# Patient Record
Sex: FEMALE | Race: BLACK OR AFRICAN AMERICAN | NOT HISPANIC OR LATINO | Employment: FULL TIME | URBAN - METROPOLITAN AREA
[De-identification: names, ages, dates, MRNs, and addresses within clinical notes are randomized per-mention and may not be internally consistent; named-entity substitution may affect disease eponyms.]

---

## 2022-09-18 ENCOUNTER — HOSPITAL ENCOUNTER (EMERGENCY)
Facility: HOSPITAL | Age: 52
Discharge: HOME | End: 2022-09-18
Attending: STUDENT IN AN ORGANIZED HEALTH CARE EDUCATION/TRAINING PROGRAM | Admitting: EMERGENCY MEDICINE

## 2022-09-18 ENCOUNTER — APPOINTMENT (OUTPATIENT)
Dept: GENERAL RADIOLOGY | Facility: HOSPITAL | Age: 52
End: 2022-09-18

## 2022-09-18 VITALS
WEIGHT: 238.76 LBS | OXYGEN SATURATION: 92 % | DIASTOLIC BLOOD PRESSURE: 78 MMHG | RESPIRATION RATE: 18 BRPM | TEMPERATURE: 98.1 F | HEIGHT: 66 IN | BODY MASS INDEX: 38.37 KG/M2 | SYSTOLIC BLOOD PRESSURE: 135 MMHG | HEART RATE: 99 BPM

## 2022-09-18 DIAGNOSIS — R21 SKIN RASH: ICD-10-CM

## 2022-09-18 DIAGNOSIS — M54.50 ACUTE RIGHT-SIDED LOW BACK PAIN WITHOUT SCIATICA: Primary | ICD-10-CM

## 2022-09-18 PROCEDURE — 25010000002 KETOROLAC TROMETHAMINE PER 15 MG: Performed by: NURSE PRACTITIONER

## 2022-09-18 PROCEDURE — 99281 EMR DPT VST MAYX REQ PHY/QHP: CPT

## 2022-09-18 PROCEDURE — 99282 EMERGENCY DEPT VISIT SF MDM: CPT

## 2022-09-18 PROCEDURE — 96372 THER/PROPH/DIAG INJ SC/IM: CPT

## 2022-09-18 PROCEDURE — 72100 X-RAY EXAM L-S SPINE 2/3 VWS: CPT

## 2022-09-18 PROCEDURE — 25010000002 ORPHENADRINE CITRATE PER 60 MG: Performed by: NURSE PRACTITIONER

## 2022-09-18 RX ORDER — ORPHENADRINE CITRATE 30 MG/ML
60 INJECTION INTRAMUSCULAR; INTRAVENOUS ONCE
Status: COMPLETED | OUTPATIENT
Start: 2022-09-18 | End: 2022-09-18

## 2022-09-18 RX ORDER — CYCLOBENZAPRINE HCL 10 MG
10 TABLET ORAL 3 TIMES DAILY PRN
Qty: 15 TABLET | Refills: 0 | Status: SHIPPED | OUTPATIENT
Start: 2022-09-18

## 2022-09-18 RX ORDER — CLOTRIMAZOLE AND BETAMETHASONE DIPROPIONATE 10; .64 MG/G; MG/G
1 CREAM TOPICAL 2 TIMES DAILY
Qty: 20 G | Refills: 0 | Status: SHIPPED | OUTPATIENT
Start: 2022-09-18 | End: 2022-09-28

## 2022-09-18 RX ORDER — KETOROLAC TROMETHAMINE 30 MG/ML
60 INJECTION, SOLUTION INTRAMUSCULAR; INTRAVENOUS ONCE
Status: COMPLETED | OUTPATIENT
Start: 2022-09-18 | End: 2022-09-18

## 2022-09-18 RX ORDER — KETOROLAC TROMETHAMINE 10 MG/1
10 TABLET, FILM COATED ORAL EVERY 6 HOURS PRN
Qty: 20 TABLET | Refills: 0 | Status: SHIPPED | OUTPATIENT
Start: 2022-09-18 | End: 2022-11-21

## 2022-09-18 RX ADMIN — ORPHENADRINE CITRATE 60 MG: 60 INJECTION INTRAMUSCULAR; INTRAVENOUS at 14:44

## 2022-09-18 RX ADMIN — KETOROLAC TROMETHAMINE 60 MG: 30 INJECTION, SOLUTION INTRAMUSCULAR at 14:44

## 2023-03-29 ENCOUNTER — TELEPHONE (OUTPATIENT)
Dept: FAMILY MEDICINE CLINIC | Facility: CLINIC | Age: 53
End: 2023-03-29

## 2023-03-29 NOTE — TELEPHONE ENCOUNTER
Patient missed appointment on 03/29/2023 @ 1:00 with Sushila Umana. Called number in chart. No answer. Left message explaining policy concerning missed appointments and same day cancellations. Offered to reschedule appointment

## 2023-03-31 ENCOUNTER — TELEPHONE (OUTPATIENT)
Dept: FAMILY MEDICINE CLINIC | Facility: CLINIC | Age: 53
End: 2023-03-31
Payer: MEDICAID

## 2023-03-31 NOTE — TELEPHONE ENCOUNTER
Patient missed appointment on 03/29/2023 @ 1:00 with Sushila Umana. Called number in chart. No answer. Second request. Sending letter.

## 2024-08-13 ENCOUNTER — HOSPITAL ENCOUNTER (EMERGENCY)
Facility: HOSPITAL | Age: 54
Discharge: HOME | End: 2024-08-13
Attending: EMERGENCY MEDICINE
Payer: MEDICAID

## 2024-08-13 VITALS
OXYGEN SATURATION: 96 % | SYSTOLIC BLOOD PRESSURE: 172 MMHG | HEIGHT: 66 IN | RESPIRATION RATE: 17 BRPM | DIASTOLIC BLOOD PRESSURE: 94 MMHG | WEIGHT: 220.24 LBS | HEART RATE: 92 BPM | BODY MASS INDEX: 35.4 KG/M2 | TEMPERATURE: 98.4 F

## 2024-08-13 DIAGNOSIS — U07.1 COVID: Primary | ICD-10-CM

## 2024-08-13 LAB
FLUAV SUBTYP SPEC NAA+PROBE: NOT DETECTED
FLUBV RNA ISLT QL NAA+PROBE: NOT DETECTED
RSV RNA NPH QL NAA+NON-PROBE: NOT DETECTED
S PYO AG THROAT QL: NEGATIVE
SARS-COV-2 RNA RESP QL NAA+PROBE: DETECTED

## 2024-08-13 PROCEDURE — 99283 EMERGENCY DEPT VISIT LOW MDM: CPT

## 2024-08-13 PROCEDURE — 96372 THER/PROPH/DIAG INJ SC/IM: CPT

## 2024-08-13 PROCEDURE — 25010000002 KETOROLAC TROMETHAMINE PER 15 MG: Performed by: EMERGENCY MEDICINE

## 2024-08-13 PROCEDURE — 87880 STREP A ASSAY W/OPTIC: CPT | Performed by: EMERGENCY MEDICINE

## 2024-08-13 PROCEDURE — 87637 SARSCOV2&INF A&B&RSV AMP PRB: CPT | Performed by: EMERGENCY MEDICINE

## 2024-08-13 PROCEDURE — 87081 CULTURE SCREEN ONLY: CPT | Performed by: EMERGENCY MEDICINE

## 2024-08-13 RX ORDER — BENZONATATE 200 MG/1
200 CAPSULE ORAL 3 TIMES DAILY PRN
Qty: 20 CAPSULE | Refills: 0 | Status: SHIPPED | OUTPATIENT
Start: 2024-08-13

## 2024-08-13 RX ORDER — ACETAMINOPHEN 325 MG/1
650 TABLET ORAL ONCE
Status: COMPLETED | OUTPATIENT
Start: 2024-08-13 | End: 2024-08-13

## 2024-08-13 RX ORDER — KETOROLAC TROMETHAMINE 10 MG/1
10 TABLET, FILM COATED ORAL EVERY 6 HOURS PRN
Qty: 15 TABLET | Refills: 0 | Status: SHIPPED | OUTPATIENT
Start: 2024-08-13

## 2024-08-13 RX ORDER — KETOROLAC TROMETHAMINE 30 MG/ML
60 INJECTION, SOLUTION INTRAMUSCULAR; INTRAVENOUS ONCE
Status: COMPLETED | OUTPATIENT
Start: 2024-08-13 | End: 2024-08-13

## 2024-08-13 RX ADMIN — ACETAMINOPHEN 650 MG: 325 TABLET ORAL at 10:49

## 2024-08-13 RX ADMIN — KETOROLAC TROMETHAMINE 60 MG: 30 INJECTION, SOLUTION INTRAMUSCULAR at 10:50

## 2024-08-13 NOTE — ED PROVIDER NOTES
Time: 10:39 AM EDT  Date of encounter:  8/13/2024  Independent Historian/Clinical History and Information was obtained by:   Patient    History is limited by: N/A    Chief Complaint: Cough      History of Present Illness:  Patient is a 54 y.o. year old female who presents to the emergency department for evaluation of cough and shortness of breath accompanied by headache that started 2 days ago.  Patient also reports subjective fever.  Patient denies nausea, vomiting, and diarrhea.  Patient has no leg pain or swelling.  Patient has no dysuria or urinary frequency.    HPI    Patient Care Team  Primary Care Provider: Provider, No Known    Past Medical History:     No Known Allergies  History reviewed. No pertinent past medical history.  Past Surgical History:   Procedure Laterality Date    BREAST AUGMENTATION      HYSTERECTOMY      TONSILLECTOMY      TUBAL ABDOMINAL LIGATION       History reviewed. No pertinent family history.    Home Medications:  Prior to Admission medications    Medication Sig Start Date End Date Taking? Authorizing Provider   cyclobenzaprine (FLEXERIL) 10 MG tablet Take 1 tablet by mouth 3 (Three) Times a Day As Needed for Muscle Spasms for up to 15 doses. 9/18/22   Sharri Faustin APRN   ondansetron ODT (ZOFRAN-ODT) 4 MG disintegrating tablet Place 1 tablet on the tongue Every 8 (Eight) Hours As Needed for Nausea or Vomiting. 1/30/24   Debbie Murphy PA-C        Social History:   Social History     Tobacco Use    Smoking status: Every Day     Types: Cigarettes    Smokeless tobacco: Never   Vaping Use    Vaping status: Former   Substance Use Topics    Alcohol use: Not Currently    Drug use: Never         Review of Systems:  Review of Systems   Constitutional:  Negative for chills and fever.   HENT:  Negative for congestion, rhinorrhea and sore throat.    Eyes:  Negative for pain and visual disturbance.   Respiratory:  Positive for cough. Negative for apnea, chest tightness and shortness of  "breath.    Cardiovascular:  Negative for chest pain and palpitations.   Gastrointestinal:  Negative for abdominal pain, diarrhea, nausea and vomiting.   Genitourinary:  Negative for difficulty urinating and dysuria.   Musculoskeletal:  Negative for joint swelling and myalgias.   Skin:  Negative for color change.   Neurological:  Positive for headaches. Negative for seizures.   Psychiatric/Behavioral: Negative.     All other systems reviewed and are negative.       Physical Exam:  /94 (BP Location: Left arm, Patient Position: Sitting)   Pulse 91   Temp 98.4 °F (36.9 °C) (Oral)   Resp 16   Ht 167.6 cm (66\")   Wt 99.9 kg (220 lb 3.8 oz)   SpO2 96%   BMI 35.55 kg/m²     Physical Exam  Vitals and nursing note reviewed.   Constitutional:       General: She is not in acute distress.     Appearance: Normal appearance. She is not toxic-appearing.   HENT:      Head: Normocephalic and atraumatic.      Jaw: There is normal jaw occlusion.   Eyes:      General: Lids are normal.      Extraocular Movements: Extraocular movements intact.      Conjunctiva/sclera: Conjunctivae normal.      Pupils: Pupils are equal, round, and reactive to light.   Cardiovascular:      Rate and Rhythm: Normal rate and regular rhythm.      Pulses: Normal pulses.      Heart sounds: Normal heart sounds.   Pulmonary:      Effort: Pulmonary effort is normal. No respiratory distress.      Breath sounds: Normal breath sounds. No wheezing or rhonchi.   Abdominal:      General: Abdomen is flat.      Palpations: Abdomen is soft.      Tenderness: There is no abdominal tenderness. There is no guarding or rebound.   Musculoskeletal:         General: Normal range of motion.      Cervical back: Normal range of motion and neck supple.      Right lower leg: No edema.      Left lower leg: No edema.   Skin:     General: Skin is warm and dry.   Neurological:      Mental Status: She is alert and oriented to person, place, and time. Mental status is at baseline. "   Psychiatric:         Mood and Affect: Mood normal.                  Procedures:  Procedures      Medical Decision Making:      Comorbidities that affect care:    None    External Notes reviewed:    Previous Clinic Note: Patient was last seen in clinic for sinus congestion.      The following orders were placed and all results were independently analyzed by me:  Orders Placed This Encounter   Procedures    COVID-19, FLU A/B, RSV PCR 1 HR TAT - Swab, Nasopharynx    Rapid Strep A Screen - Swab, Throat    Beta Strep Culture, Throat - Swab, Throat       Medications Given in the Emergency Department:  Medications   ketorolac (TORADOL) injection 60 mg (60 mg Intramuscular Given 8/13/24 1050)   acetaminophen (TYLENOL) tablet 650 mg (650 mg Oral Given 8/13/24 1049)        ED Course:         Labs:    Lab Results (last 24 hours)       Procedure Component Value Units Date/Time    COVID-19, FLU A/B, RSV PCR 1 HR TAT - Swab, Nasopharynx [[id number]]  (Abnormal) Collected: 08/13/24 0907    Specimen: Swab from Nasopharynx Updated: 08/13/24 1014     COVID19 Detected     Influenza A PCR Not Detected     Influenza B PCR Not Detected     RSV, PCR Not Detected    Narrative:      Fact sheet for providers: https://www.fda.gov/media/531136/download    Fact sheet for patients: https://www.fda.gov/media/206814/download    Test performed by PCR.    Rapid Strep A Screen - Swab, Throat [[id number]]  (Normal) Collected: 08/13/24 0907    Specimen: Swab from Throat Updated: 08/13/24 0957     Strep A Ag Negative    Beta Strep Culture, Throat - Swab, Throat [[id number]] Collected: 08/13/24 0907    Specimen: Swab from Throat Updated: 08/13/24 0957             Imaging:    No Radiology Exams Resulted Within Past 24 Hours      Differential Diagnosis and Discussion:    Viral syndrome: Differential diagnosis includes but is not limited to influenza, common cold, COVID-19, RSV, adenovirus, enteroviruses, herpes virus, hepatitis virus, measles, mumps,  rubella, dengue fever, and possible bacterial infection.    All labs were reviewed and interpreted by me.    MDM     The patient is resting comfortably and feels better, is alert and in no distress. Influenza swab is negative.  COVID-19 swab is positive.  On re-examination the patient does not appear toxic and has no meningeal signs (including a negative Kernig and Brudzinski sign), and there's no intractable vomiting, respiratory distress and no apparent pain. Based on the history, exam, diagnostic testing and reassessment, the patient has no signs of meningitis, significant pneumonia, pyelonephritis, sepsis or other acute serious bacterial infections, or other significant pathology to warrant further testing, continued ED treatment, admission or specialist evaluation. The patient's vital signs have been stable. The patient's condition is stable and is appropriate for discharge.  The patient´s symptoms are consistent with a viral syndrome. The patient was counseled to return to the ED for re-evaluation for worsening cough, shortness of breath, uncontrollable headache, uncontrollable fever, altered mental status, or any symptoms which cause them concern. The patient will pursue further outpatient evaluation with the primary care physician or other designated or consultant physician as indicated in the discharge instructions.          Patient Care Considerations:    ANTIVIRAL: I considered prescribing antiviral medication as an outpatient, however patient is several days post effectiveness of Paxlovid.      Consultants/Shared Management Plan:    None    Social Determinants of Health:    Patient is independent, reliable, and has access to care.       Disposition and Care Coordination:    Discharged: I considered escalation of care by admitting this patient to the hospital, however patient has no respiratory distress or hypoxia.    I have explained the patient´s condition, diagnoses and treatment plan based on the  information available to me at this time. I have answered questions and addressed any concerns. The patient has a good  understanding of the patient´s diagnosis, condition, and treatment plan as can be expected at this point. The vital signs have been stable. The patient´s condition is stable and appropriate for discharge from the emergency department.      The patient will pursue further outpatient evaluation with the primary care physician or other designated or consulting physician as outlined in the discharge instructions. They are agreeable to this plan of care and follow-up instructions have been explained in detail. The patient has received these instructions in written format and has expressed an understanding of the discharge instructions. The patient is aware that any significant change in condition or worsening of symptoms should prompt an immediate return to this or the closest emergency department or call to 911.  I have explained discharge medications and the need for follow up with the patient/caretakers. This was also printed in the discharge instructions. Patient was discharged with the following medications and follow up:      Medication List        New Prescriptions      benzonatate 200 MG capsule  Commonly known as: TESSALON  Take 1 capsule by mouth 3 (Three) Times a Day As Needed for Cough.     ketorolac 10 MG tablet  Commonly known as: TORADOL  Take 1 tablet by mouth Every 6 (Six) Hours As Needed for Moderate Pain.               Where to Get Your Medications        These medications were sent to [facility] [id number] [place], [place] - [address] [facility] - [phone]  - [phone]   [address] [facility] [address]      Phone: [phone]   benzonatate 200 MG capsule  ketorolac 10 MG tablet      Provider, No Known  [facility] [id number] [phone]    In 2 days         Final diagnoses:   COVID        ED Disposition       ED  Disposition   Discharge    Condition   Stable    Comment   --               This medical record created using voice recognition software.             Stefany Teixeira MD  08/13/24 1111

## 2024-08-13 NOTE — Clinical Note
[facility] EMERGENCY ROOM  [address] [place] [address]  Phone: [phone]  Fax: [phone]    Queta Bower was seen and treated in our emergency department on 8/13/2024.  She may return to work on 08/17/2024.         Thank you for choosing [facility].    Stefany Teixeira MD

## 2024-08-15 LAB — BACTERIA SPEC AEROBE CULT: NORMAL
